# Patient Record
Sex: FEMALE | Race: WHITE | NOT HISPANIC OR LATINO | URBAN - METROPOLITAN AREA
[De-identification: names, ages, dates, MRNs, and addresses within clinical notes are randomized per-mention and may not be internally consistent; named-entity substitution may affect disease eponyms.]

---

## 2017-01-31 ENCOUNTER — INPATIENT (INPATIENT)
Facility: HOSPITAL | Age: 27
LOS: 1 days | Discharge: HOME | End: 2017-02-02
Attending: PSYCHIATRY & NEUROLOGY | Admitting: PSYCHIATRY & NEUROLOGY
Payer: MEDICAID

## 2017-01-31 VITALS
OXYGEN SATURATION: 100 % | HEIGHT: 62 IN | RESPIRATION RATE: 18 BRPM | SYSTOLIC BLOOD PRESSURE: 111 MMHG | TEMPERATURE: 98 F | DIASTOLIC BLOOD PRESSURE: 69 MMHG | HEART RATE: 86 BPM | WEIGHT: 132.28 LBS

## 2017-01-31 PROCEDURE — 99284 EMERGENCY DEPT VISIT MOD MDM: CPT

## 2017-01-31 NOTE — ED ADULT TRIAGE NOTE - CHIEF COMPLAINT QUOTE
pt stated that last night while upset she said she was going to hurt herself.  this evening after take a regular dose of biotin she threw up and family got concerned and called 911 because they thought she was trying to hurt herself.

## 2017-02-01 DIAGNOSIS — R69 ILLNESS, UNSPECIFIED: ICD-10-CM

## 2017-02-01 DIAGNOSIS — F12.10 CANNABIS ABUSE, UNCOMPLICATED: ICD-10-CM

## 2017-02-01 DIAGNOSIS — F39 UNSPECIFIED MOOD [AFFECTIVE] DISORDER: ICD-10-CM

## 2017-02-01 LAB
ALBUMIN SERPL ELPH-MCNC: 4.4 G/DL — SIGNIFICANT CHANGE UP (ref 3.3–5)
ALP SERPL-CCNC: 54 U/L — SIGNIFICANT CHANGE UP (ref 40–120)
ALT FLD-CCNC: 24 U/L — SIGNIFICANT CHANGE UP (ref 12–78)
AMPHET UR-MCNC: NEGATIVE — SIGNIFICANT CHANGE UP
ANION GAP SERPL CALC-SCNC: 11 MMOL/L — SIGNIFICANT CHANGE UP (ref 5–17)
APAP SERPL-MCNC: <2 UG/ML — LOW (ref 10–30)
APPEARANCE UR: CLEAR — SIGNIFICANT CHANGE UP
AST SERPL-CCNC: 17 U/L — SIGNIFICANT CHANGE UP (ref 15–37)
BACTERIA # UR AUTO: (no result)
BARBITURATES UR SCN-MCNC: NEGATIVE — SIGNIFICANT CHANGE UP
BASOPHILS # BLD AUTO: 0.2 K/UL — SIGNIFICANT CHANGE UP (ref 0–0.2)
BASOPHILS NFR BLD AUTO: 1.2 % — SIGNIFICANT CHANGE UP (ref 0–2)
BENZODIAZ UR-MCNC: NEGATIVE — SIGNIFICANT CHANGE UP
BILIRUB SERPL-MCNC: 0.4 MG/DL — SIGNIFICANT CHANGE UP (ref 0.2–1.2)
BILIRUB UR-MCNC: NEGATIVE — SIGNIFICANT CHANGE UP
BUN SERPL-MCNC: 18 MG/DL — SIGNIFICANT CHANGE UP (ref 7–23)
CALCIUM SERPL-MCNC: 9.4 MG/DL — SIGNIFICANT CHANGE UP (ref 8.5–10.1)
CHLORIDE SERPL-SCNC: 104 MMOL/L — SIGNIFICANT CHANGE UP (ref 96–108)
CO2 SERPL-SCNC: 22 MMOL/L — SIGNIFICANT CHANGE UP (ref 22–31)
COCAINE METAB.OTHER UR-MCNC: NEGATIVE — SIGNIFICANT CHANGE UP
COLOR SPEC: YELLOW — SIGNIFICANT CHANGE UP
CREAT SERPL-MCNC: 0.83 MG/DL — SIGNIFICANT CHANGE UP (ref 0.5–1.3)
DIFF PNL FLD: (no result)
EOSINOPHIL # BLD AUTO: 0 K/UL — SIGNIFICANT CHANGE UP (ref 0–0.5)
EOSINOPHIL NFR BLD AUTO: 0.2 % — SIGNIFICANT CHANGE UP (ref 0–6)
EPI CELLS # UR: (no result)
ETHANOL SERPL-MCNC: <10 MG/DL — SIGNIFICANT CHANGE UP (ref 0–10)
GLUCOSE SERPL-MCNC: 77 MG/DL — SIGNIFICANT CHANGE UP (ref 70–99)
GLUCOSE UR QL: NEGATIVE MG/DL — SIGNIFICANT CHANGE UP
HCG SERPL-ACNC: <1 MIU/ML — SIGNIFICANT CHANGE UP
HCG UR QL: NEGATIVE — SIGNIFICANT CHANGE UP
HCT VFR BLD CALC: 43.3 % — SIGNIFICANT CHANGE UP (ref 34.5–45)
HGB BLD-MCNC: 14.2 G/DL — SIGNIFICANT CHANGE UP (ref 11.5–15.5)
KETONES UR-MCNC: (no result)
LEUKOCYTE ESTERASE UR-ACNC: (no result)
LYMPHOCYTES # BLD AUTO: 1.9 K/UL — SIGNIFICANT CHANGE UP (ref 1–3.3)
LYMPHOCYTES # BLD AUTO: 14.3 % — SIGNIFICANT CHANGE UP (ref 13–44)
MCHC RBC-ENTMCNC: 27.1 PG — SIGNIFICANT CHANGE UP (ref 27–34)
MCHC RBC-ENTMCNC: 32.8 GM/DL — SIGNIFICANT CHANGE UP (ref 32–36)
MCV RBC AUTO: 82.5 FL — SIGNIFICANT CHANGE UP (ref 80–100)
METHADONE UR-MCNC: NEGATIVE — SIGNIFICANT CHANGE UP
MONOCYTES # BLD AUTO: 0.9 K/UL — SIGNIFICANT CHANGE UP (ref 0–0.9)
MONOCYTES NFR BLD AUTO: 6.8 % — SIGNIFICANT CHANGE UP (ref 2–14)
NEUTROPHILS # BLD AUTO: 10.6 K/UL — HIGH (ref 1.8–7.4)
NEUTROPHILS NFR BLD AUTO: 77.6 % — HIGH (ref 43–77)
NITRITE UR-MCNC: NEGATIVE — SIGNIFICANT CHANGE UP
OPIATES UR-MCNC: NEGATIVE — SIGNIFICANT CHANGE UP
PCP SPEC-MCNC: SIGNIFICANT CHANGE UP
PCP UR-MCNC: NEGATIVE — SIGNIFICANT CHANGE UP
PH UR: 5 — SIGNIFICANT CHANGE UP (ref 4.8–8)
PLATELET # BLD AUTO: 299 K/UL — SIGNIFICANT CHANGE UP (ref 150–400)
POTASSIUM SERPL-MCNC: 3.9 MMOL/L — SIGNIFICANT CHANGE UP (ref 3.5–5.3)
POTASSIUM SERPL-SCNC: 3.9 MMOL/L — SIGNIFICANT CHANGE UP (ref 3.5–5.3)
PROT SERPL-MCNC: 8.3 GM/DL — SIGNIFICANT CHANGE UP (ref 6–8.3)
PROT UR-MCNC: 30 MG/DL
RBC # BLD: 5.24 M/UL — HIGH (ref 3.8–5.2)
RBC # FLD: 12.2 % — SIGNIFICANT CHANGE UP (ref 10.3–14.5)
RBC CASTS # UR COMP ASSIST: SIGNIFICANT CHANGE UP /HPF (ref 0–4)
SALICYLATES SERPL-MCNC: 10 MG/DL — SIGNIFICANT CHANGE UP (ref 2.8–20)
SODIUM SERPL-SCNC: 137 MMOL/L — SIGNIFICANT CHANGE UP (ref 135–145)
SP GR SPEC: 1.02 — SIGNIFICANT CHANGE UP (ref 1.01–1.02)
THC UR QL: POSITIVE — SIGNIFICANT CHANGE UP
TSH SERPL-MCNC: 0.58 UIU/ML — SIGNIFICANT CHANGE UP (ref 0.36–3.74)
UROBILINOGEN FLD QL: 1 MG/DL
WBC # BLD: 13.6 K/UL — HIGH (ref 3.8–10.5)
WBC # FLD AUTO: 13.6 K/UL — HIGH (ref 3.8–10.5)
WBC UR QL: (no result)

## 2017-02-01 PROCEDURE — 93010 ELECTROCARDIOGRAM REPORT: CPT

## 2017-02-01 RX ORDER — CLONAZEPAM 1 MG
0 TABLET ORAL
Qty: 0 | Refills: 0 | COMMUNITY

## 2017-02-01 RX ORDER — CLONAZEPAM 1 MG
2 TABLET ORAL AT BEDTIME
Qty: 0 | Refills: 0 | Status: DISCONTINUED | OUTPATIENT
Start: 2017-02-01 | End: 2017-02-02

## 2017-02-01 RX ORDER — INFLUENZA VIRUS VACCINE 15; 15; 15; 15 UG/.5ML; UG/.5ML; UG/.5ML; UG/.5ML
0.5 SUSPENSION INTRAMUSCULAR ONCE
Qty: 0 | Refills: 0 | Status: COMPLETED | OUTPATIENT
Start: 2017-02-01 | End: 2017-02-01

## 2017-02-01 RX ORDER — FLUOXETINE HCL 10 MG
20 CAPSULE ORAL ONCE
Qty: 0 | Refills: 0 | Status: COMPLETED | OUTPATIENT
Start: 2017-02-01 | End: 2017-02-02

## 2017-02-01 RX ORDER — BUPROPION HYDROCHLORIDE 150 MG/1
150 TABLET, EXTENDED RELEASE ORAL DAILY
Qty: 0 | Refills: 0 | Status: DISCONTINUED | OUTPATIENT
Start: 2017-02-01 | End: 2017-02-02

## 2017-02-01 RX ORDER — BUPROPION HYDROCHLORIDE 150 MG/1
0 TABLET, EXTENDED RELEASE ORAL
Qty: 0 | Refills: 0 | COMMUNITY

## 2017-02-01 RX ADMIN — Medication 2 MILLIGRAM(S): at 21:42

## 2017-02-01 NOTE — ED PROVIDER NOTE - PROGRESS NOTE DETAILS
Patient is medically cleared for discharge to home/psych hospital ISTOP reviewed; pt with klonazepam 2mg Rx last filled 12/22/16 d/w [facility], pt is on the list to be seen

## 2017-02-01 NOTE — ED ADULT NURSE NOTE - OBJECTIVE STATEMENT
Patient brought in by mother with concern of suicide. Per mother, patient broke up with boyfriend 2 days ago and has been depressed, crying. Mother reports daughter told her "she wanted to hurt herself". Patient reports taking 2 Biotin pills that made her nauseous and vomiting x 1 time. Patient denies SI, plan or hx of suicidal ideation. Denies medical hx. Reports patient taking Klonopin and Welbutrin for TMJ. Patient lives in [place], visiting mother. Patient calm and cooperative at this time. Belongings given to mother 1:1 in progress.

## 2017-02-01 NOTE — ED PROVIDER NOTE - DETAILS:
Myrna Art MD - The scribe's documentation has been prepared under my direction and personally reviewed by me in its entirety. I confirm that the note above accurately reflects all work, treatment, procedures, and medical decision making performed by me.

## 2017-02-01 NOTE — ED ADULT NURSE REASSESSMENT NOTE - NS ED NURSE REASSESS COMMENT FT1
pt received at 0700, alert and orientedx4, VSS afebrile, pt resting comfortably in bed with mother, no complaints at this time, eager to meet with psych. All needs anticipated and met, safety maintained, will continue to monitor

## 2017-02-01 NOTE — ED BEHAVIORAL HEALTH ASSESSMENT NOTE - DESCRIPTION
Pt. had difficulty waiting several hours in ED, but remained calm. Once told recommendation was for admission began screaming and pleading for D/C.  Addressed with pt. the conflicting stories and guarded, vague presentation. Identified story differed in the context of learning of the admission. tearful and mother asked to leave ED. TMJ 26 y.o. SWF lives with father in [place]  and often with mother in [place]. Stated was here in [place] to "care for mother with RA". Mother states "not true, to see her BF"

## 2017-02-01 NOTE — ED ADULT NURSE REASSESSMENT NOTE - NS ED NURSE REASSESS COMMENT FT1
Pt aggitated about being in ED and states she wants to leave, Mary made aware, pt permitted to make a phone call, pt called mom. Emotional support provided. Will cotreginanue to monitor

## 2017-02-01 NOTE — ED PROVIDER NOTE - CARE PLAN
Principal Discharge DX:	Severe episode of recurrent major depressive disorder, without psychotic features

## 2017-02-01 NOTE — ED BEHAVIORAL HEALTH ASSESSMENT NOTE - RISK ASSESSMENT
TEXTED AND VERBALIZED SI TO MOTHER WITH PLAN. HX. POOR COPIN SKILLS, RECENT SIGNIFICANT LOSS. RISK DETERMINED

## 2017-02-01 NOTE — ED BEHAVIORAL HEALTH ASSESSMENT NOTE - PERSONAL COLLATERAL NAME
mother Nadia: shared the suicidal text with plan received on Monday and stated last night was found "listless "in room and ambulance called as "pills all over the room and a kitchen knife in room" Pt.'s mood has been labile and intensified since BF boke up with er recently after 12 years. Has had screaming outbursts, has not been eating  and voicing SI

## 2017-02-01 NOTE — ED BEHAVIORAL HEALTH ASSESSMENT NOTE - HPI (INCLUDE ILLNESS QUALITY, SEVERITY, DURATION, TIMING, CONTEXT, MODIFYING FACTORS, ASSOCIATED SIGNS AND SYMPTOMS)
BIBA after found "not responding and listless" per mother by brother last asuncion. Pt.'s BF broke up with her after 12 years "because she is crazy". Mother states and shared a text received by pt. stating SI and plan to go into traffic. Pt. has also voiced SI since break-up.  Pt. admits to impaired sleep and appetite, stating hasn't eaten in days and sleeping poorly. Did not eat in ED this AM. No evidence of psychosis, denies A/V/O/T hallucinations. Mood depressed, c/o longstanding anxiety. has been calling in sick to work and arriving late. 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   Primary residence is in [place] with father.   A&Ox3, vague and guarded during interview. Angry that collateral was included in evaluation.    Pt's version of history differed from mother. pt. was unable to provide clinician's names other than Dr. perales whom pt. has seen 2x.   Family history of bipolar illness. pt. calls mother a "drama johnson". Mother appeared genuinely concerned and tearful at times, stating pt. has few coping skills.   No evidence of brittany, denies HIIP. Denies current SIIP. Reports took Biotin for face last night and vomited. Mother states pills and knife found I room.

## 2017-02-01 NOTE — ED ADULT NURSE REASSESSMENT NOTE - NS ED NURSE REASSESS COMMENT FT1
Patient resting, calm and cooperative. Pending telespych. 1:1 continues for safety. Will continue monitoring patient.

## 2017-02-02 VITALS
HEART RATE: 80 BPM | RESPIRATION RATE: 16 BRPM | TEMPERATURE: 99 F | SYSTOLIC BLOOD PRESSURE: 101 MMHG | DIASTOLIC BLOOD PRESSURE: 67 MMHG | OXYGEN SATURATION: 100 %

## 2017-02-02 RX ORDER — FLUOXETINE HCL 10 MG
1 CAPSULE ORAL
Qty: 0 | Refills: 0 | COMMUNITY

## 2017-02-02 RX ORDER — FLUOXETINE HCL 10 MG
1 CAPSULE ORAL
Qty: 5 | Refills: 0 | OUTPATIENT
Start: 2017-02-02

## 2017-02-02 RX ORDER — CLONAZEPAM 1 MG
1 TABLET ORAL
Qty: 5 | Refills: 0 | OUTPATIENT
Start: 2017-02-02

## 2017-02-02 RX ORDER — BUPROPION HYDROCHLORIDE 150 MG/1
1 TABLET, EXTENDED RELEASE ORAL
Qty: 5 | Refills: 0 | OUTPATIENT
Start: 2017-02-02

## 2017-02-02 RX ADMIN — Medication 20 MILLIGRAM(S): at 13:17

## 2017-02-02 RX ADMIN — BUPROPION HYDROCHLORIDE 150 MILLIGRAM(S): 150 TABLET, EXTENDED RELEASE ORAL at 12:21

## 2017-02-02 NOTE — DISCHARGE NOTE BEHAVIORAL HEALTH - NSBHDCDXVALIDYESFT_PSY_A_CORE
DSM V Criteria Patient admitted overnight  She was in control with no suicidal thoughts.  Case discussed with mother who agreed she was not an imminent risk to self

## 2017-02-02 NOTE — DISCHARGE NOTE BEHAVIORAL HEALTH - MEDICATION SUMMARY - MEDICATIONS TO TAKE
I will START or STAY ON the medications listed below when I get home from the hospital:    KlonoPIN  --  by mouth   -- Indication: For anxiety    clonazePAM 2 mg oral tablet  -- 1 tab(s) by mouth once a day MDD:2mg  -- Caution federal law prohibits the transfer of this drug to any person other  than the person for whom it was prescribed.  Do not drink alcoholic beverages when taking this medication.  Do not take this drug if you are pregnant.  It is very important that you take or use this exactly as directed.  Do not skip doses or discontinue unless directed by your doctor.  May cause drowsiness.  Alcohol may intensify this effect.  Use care when operating dangerous machinery.  Obtain medical advice before taking any non-prescription drugs as some may affect the action of this medication.  This drug may impair the ability to drive or operate machinery.  Use care until you become familiar with its effects.    -- Indication: For anxiety    PROzac 20 mg oral capsule  -- 1 cap(s) by mouth once a day  -- Indication: For Depression    Wellbutrin  --  by mouth   -- Indication: For Depression    Wellbutrin  mg/24 hours oral tablet, extended release  -- 1 tab(s) by mouth once a day  -- Check with your doctor before becoming pregnant.  Do not drink alcoholic beverages when taking this medication.  It is very important that you take or use this exactly as directed.  Do not skip doses or discontinue unless directed by your doctor.  Obtain medical advice before taking any non-prescription drugs as some may affect the action of this medication.  Swallow whole.  Do not crush.  This drug may impair the ability to drive or operate machinery.  Use care until you become familiar with its effects.    -- Indication: For Depression

## 2017-02-02 NOTE — DISCHARGE NOTE BEHAVIORAL HEALTH - NSBHDCTHERAPYFT_PSY_A_CORE
Psychosocial assessment completed with patient.  Discussed discharge planning and options for discharge.  Spoke with patient's mother to also discuss d/c plan and options.  Both in agreement with discharge.

## 2017-02-02 NOTE — PROGRESS NOTE BEHAVIORAL HEALTH - NSBHFUPINTERVALCCFT_PSY_A_CORE
Patient reports this was all a mistake.  Admits to making suicidal statements earlier in the week but relates this to break up with long-term BF and distress she was feeling

## 2017-02-02 NOTE — PROGRESS NOTE BEHAVIORAL HEALTH - RISK ASSESSMENT
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  She is forward thinking and has support of father who is in [place]

## 2017-02-02 NOTE — DISCHARGE NOTE BEHAVIORAL HEALTH - NSBHDCADDR2FT_A_CORE
[address] [facility] [address]  Across from the [facility]

## 2017-02-02 NOTE — DISCHARGE NOTE BEHAVIORAL HEALTH - PROVIDER TOKENS
FREE:[LAST:[Jennifer],FIRST:[Megan],PHONE:[phone]],FAX:[phone]    -],ADDRESS:[[address] [place] [address]]]

## 2017-02-02 NOTE — PROGRESS NOTE BEHAVIORAL HEALTH - NSBHADMITIPBHPROVFT_PSY_A_CORE
Noemi Beltrán contact by Mary Garsia- Patient does not wish to return to this provider Noemi Beltrán

## 2017-02-02 NOTE — PROGRESS NOTE BEHAVIORAL HEALTH - NSBHFUPINTERVALHXFT_PSY_A_CORE
Today patient is somewhat tearful, but appropriately so at being hospitalized and break-up with boyfriend.  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

## 2017-02-02 NOTE — DISCHARGE NOTE BEHAVIORAL HEALTH - PAST PSYCHIATRIC HISTORY
Treated as an outpatient in [place] for depression and anxiety, has gone to various providers and continued the same medications

## 2017-02-02 NOTE — DISCHARGE NOTE BEHAVIORAL HEALTH - NSBHDCSUICPROTECTFT_PSY_A_CORE
Young, healthy, Involved in career and wants to attend grad school and work in mental health profession.  Financial support from father

## 2017-02-02 NOTE — DISCHARGE NOTE BEHAVIORAL HEALTH - NSBHDCSUICFCTRMIT_PSY_A_CORE
Case discussed with mother who believes patient is stable for discharge.  Referred for psychotherapy to cope with break-up. Plans to return to [place] to put more distance between her and mother

## 2017-02-02 NOTE — DISCHARGE NOTE BEHAVIORAL HEALTH - HPI (INCLUDE ILLNESS QUALITY, SEVERITY, DURATION, TIMING, CONTEXT, MODIFYING FACTORS, ASSOCIATED SIGNS AND SYMPTOMS)
"I broke up with my BF"  27 yo woman BIBA after found "not responding and listless" per mother by brother last asuncion. Pt.'s BF broke up with her after 12 years "because she is crazy". Mother states and shared a text received by pt. stating SI and plan to go into traffic. Pt. has also voiced SI since break-up.  Pt. admits to impaired sleep and appetite, stating hasn't eaten in days and sleeping poorly. Did not eat in ED this AM. No evidence of psychosis, denies A/V/O/T hallucinations. Mood depressed, c/o longstanding anxiety. has been calling in sick to work and arriving late. 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   Primary residence is in [place] with father.   A&Ox3, vague and guarded during interview. Angry that collateral was included in evaluation.    Pt's version of history differed from mother. Pt. was unable to provide clinician's names other than Dr. perales whom pt. has seen 2x.   Family history of bipolar illness. pt. calls mother a "drama johnson". Mother appeared genuinely concerned and tearful at times, stating pt. has few coping skills.   Reports took Biotin for face last night and vomited. Mother states pills and knife found I room.

## 2017-02-02 NOTE — DISCHARGE NOTE BEHAVIORAL HEALTH - NSBHDCSUICSAFETYFT_PSY_A_CORE
Patient given number of [facility] to call if she experiences significant stress.  Appointment for new MD and therapist give.

## 2017-02-02 NOTE — PROGRESS NOTE BEHAVIORAL HEALTH - NSBHCHARTREVIEWVS_PSY_A_CORE FT
Vital Signs Last 24 Hrs  T(C): 37, Max: 37 (02-02 @ 08:54)  T(F): 98.6, Max: 98.6 (02-02 @ 08:54)  HR: 80 (80 - 80)  BP: 101/67 (101/67 - 101/67)  BP(mean): --  RR: 16 (16 - 16)  SpO2: 100% (100% - 100%)

## 2017-02-02 NOTE — PROGRESS NOTE BEHAVIORAL HEALTH - NSBHFUPADDHPIFT_PSY_A_CORE
Seen in [place] for depression and anxiety related to life stressors    MEDICATIONS  (STANDING):  buPROPion SR 150milliGRAM(s) Oral daily  clonazePAM Tablet 2milliGRAM(s) Oral at bedtime  FLUoxetine 20milliGRAM(s) Oral Once  influenza   Vaccine 0.5milliLiter(s) IntraMuscular once    MEDICATIONS  (PRN):

## 2017-02-02 NOTE — PROGRESS NOTE BEHAVIORAL HEALTH - SUMMARY
27 yo female BIBA after found "not responding and listless" per mother by brother last asuncion. Pt.'s BF broke up with her after 12 years "because she is crazy". Mother states and shared a text received by pt. stating SI and plan to go into traffic. Pt. has also voiced SI since break-up.  Pt. admits to impaired sleep and appetite, stating hasn't eaten in days and sleeping poorly. Did not eat in ED this AM. No evidence of psychosis, denies A/V/O/T hallucinations. Mood depressed, c/o longstanding anxiety. has been calling in sick to work and arriving late. 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   Primary residence is in [place] with father.

## 2017-02-02 NOTE — PROGRESS NOTE BEHAVIORAL HEALTH - NSBHFUPSTRENGTHS_PSY_A_CORE
Able to set and pursue goals/Has vocational interests or hobbies/Intelligent/Motivated and ready for change

## 2017-02-02 NOTE — DISCHARGE NOTE BEHAVIORAL HEALTH - NSBHDCCRISISPLAN1FT_PSY_A_CORE
Call 911, go to your nearest emergency room, call a crisis hotline (numbers provided), tell a family member.

## 2017-02-07 DIAGNOSIS — R45.851 SUICIDAL IDEATIONS: ICD-10-CM

## 2017-02-07 DIAGNOSIS — F33.2 MAJOR DEPRESSIVE DISORDER, RECURRENT SEVERE WITHOUT PSYCHOTIC FEATURES: ICD-10-CM

## 2017-04-29 ENCOUNTER — EMERGENCY (EMERGENCY)
Facility: HOSPITAL | Age: 27
LOS: 0 days | Discharge: HOME | End: 2017-04-29
Attending: EMERGENCY MEDICINE | Admitting: EMERGENCY MEDICINE
Payer: MEDICAID

## 2017-04-29 VITALS
SYSTOLIC BLOOD PRESSURE: 104 MMHG | HEART RATE: 83 BPM | OXYGEN SATURATION: 99 % | TEMPERATURE: 98 F | RESPIRATION RATE: 16 BRPM | DIASTOLIC BLOOD PRESSURE: 72 MMHG

## 2017-04-29 VITALS — HEIGHT: 62 IN | WEIGHT: 149.91 LBS

## 2017-04-29 DIAGNOSIS — R21 RASH AND OTHER NONSPECIFIC SKIN ERUPTION: ICD-10-CM

## 2017-04-29 PROCEDURE — 99283 EMERGENCY DEPT VISIT LOW MDM: CPT

## 2017-04-29 RX ORDER — VALACYCLOVIR 500 MG/1
1 TABLET, FILM COATED ORAL
Qty: 21 | Refills: 0 | OUTPATIENT
Start: 2017-04-29 | End: 2017-05-06

## 2017-04-29 RX ORDER — PERMETHRIN CREAM 5% W/W 50 MG/G
1 CREAM TOPICAL ONCE
Qty: 0 | Refills: 0 | Status: COMPLETED | OUTPATIENT
Start: 2017-04-29 | End: 2017-04-29

## 2017-04-29 RX ORDER — VALACYCLOVIR 500 MG/1
1000 TABLET, FILM COATED ORAL ONCE
Qty: 0 | Refills: 0 | Status: COMPLETED | OUTPATIENT
Start: 2017-04-29 | End: 2017-04-29

## 2017-04-29 RX ADMIN — VALACYCLOVIR 1000 MILLIGRAM(S): 500 TABLET, FILM COATED ORAL at 22:14

## 2017-04-29 RX ADMIN — PERMETHRIN CREAM 5% W/W 1 APPLICATION(S): 50 CREAM TOPICAL at 22:14

## 2017-04-29 NOTE — ED STATDOCS - MEDICAL DECISION MAKING DETAILS
Patient extremely worried the rash is scabies and requesting treatment.  I think rash looks more like early zoster virus/herpes virus.

## 2017-04-29 NOTE — ED STATDOCS - OBJECTIVE STATEMENT
25 y/o female c/o localized itching rash to right hand. Pt states her dog at home has been itching itself. She states she is worried about scabies because her friends dog had scabies recently.

## 2017-04-29 NOTE — ED STATDOCS - SKIN, MLM
Raised palpable dry area of skin on the right thenar eminence. hard scabbed puncture wound on left lateral foot.

## 2017-04-29 NOTE — ED STATDOCS - DETAILS:
I, Magda Henriquez, performed the initial face to face bedside interview with this patient regarding history of present illness, review of symptoms and relevant past medical, social and family history.  I completed an independent physical examination.  I was the initial provider who evaluated this patient.   The history, relevant review of systems, past medical and surgical history, medical decision making, and physical examination was documented by the scribe in my presence and I attest to the accuracy of the documentation.

## 2017-04-29 NOTE — ED STATDOCS - NS ED MD SCRIBE ATTENDING SCRIBE SECTIONS
REVIEW OF SYSTEMS/PROGRESS NOTE/HISTORY OF PRESENT ILLNESS/PAST MEDICAL/SURGICAL/SOCIAL HISTORY/PHYSICAL EXAM/DISPOSITION/RESULTS

## 2018-06-05 ENCOUNTER — TRANSCRIPTION ENCOUNTER (OUTPATIENT)
Age: 28
End: 2018-06-05

## 2019-09-25 NOTE — PATIENT PROFILE BEHAVIORAL HEALTH - NSBHCOPING_PSY_A_CORE
----- Message from Edwin Gan MD sent at 9/25/2019 12:58 PM CDT -----  Anemia much improved although still anemic.  Still unclear on why she was so anemic.  Should be seen by GI.  Please refer.  
Called and advised patient anemia much improved although still anemic.  Still unclear on why she was so anemic.  Should be seen by GI. Patient verbalized understanding.   Referral placed.   
ineffective/smoking/marijuana

## 2023-03-15 NOTE — ED PROVIDER NOTE - OBJECTIVE STATEMENT
Vit D is much better  Ca upto 9.4  PTH is low as expected  Continue current meds  Once she completes Ergocalciferol 50,000 units next month then take OTC Vit D3 2000 units daily  Repeat Ca, Vit D after 3 months 25 y/o female with hx of anxiety on wellbutrin presents to the ED stating she just broke up with her boyfriend of 12 years and is feeling depressed, she texted her mother and said she was very depressed and afraid of what she might do and her mother called the police. Pt states she took 2 biotin PTA. When police arrived they found empty bottle of klonipin, which pt states was there because she finished her prescription on time and seeing her psychiatrist in 2 days. Pt denies overdosing on any of her medications, has no complaints, and denies SI/HI. Pt's mother states pt has untreated bipolar disorder and has been increasingly unstable and expressing on multiple occasions suicidal ideations.

## 2023-04-10 NOTE — ED PROVIDER NOTE - NS ED NOTE AC HIGH RISK COUNTRIES
No Refill request received for Norco 5-325mg, 1-2 tabs Q4 PRN  Last office visit: 4/5/2023  Next office visit: 6/7/2023  Last refill: 3/30/2023  Per PDMP - last dispensed: 3/30/2023, #30 / 3 day supply  PDMP reviewed; no aberrant behavior identified.  Refill request routed to Dr. Del Rio for review and signing.  Preferred pharmacy verified.

## 2023-08-25 NOTE — ED PROVIDER NOTE - CADM POA URETHRAL CATHETER
Attempted [facility] monthly outreach,  unable to leave a message, no voicemail . Deltagenhart message sent. Medical record reviewed including recent office visits and test results.     Chart review - 3 min  Time with patient - 2 min  Total time - 5 min No

## 2025-04-16 NOTE — ED STATDOCS - NORMAL, MLM
Detail Level: Detailed Quality 226: Preventive Care And Screening: Tobacco Use: Screening And Cessation Intervention: Patient screened for tobacco use and is an ex/non-smoker kacie all pertinent systems normal